# Patient Record
Sex: MALE | Race: WHITE | Employment: UNEMPLOYED | URBAN - METROPOLITAN AREA
[De-identification: names, ages, dates, MRNs, and addresses within clinical notes are randomized per-mention and may not be internally consistent; named-entity substitution may affect disease eponyms.]

---

## 2019-09-02 ENCOUNTER — HOSPITAL ENCOUNTER (INPATIENT)
Age: 34
LOS: 5 days | Discharge: HOME | DRG: 885 | End: 2019-09-07
Attending: PSYCHIATRY & NEUROLOGY | Admitting: PSYCHIATRY & NEUROLOGY

## 2019-09-02 PROCEDURE — 1240000000 HC EMOTIONAL WELLNESS R&B

## 2019-09-03 PROCEDURE — 6370000000 HC RX 637 (ALT 250 FOR IP): Performed by: PSYCHIATRY & NEUROLOGY

## 2019-09-03 PROCEDURE — 90792 PSYCH DIAG EVAL W/MED SRVCS: CPT | Performed by: NURSE PRACTITIONER

## 2019-09-03 PROCEDURE — 1240000000 HC EMOTIONAL WELLNESS R&B

## 2019-09-03 PROCEDURE — 6370000000 HC RX 637 (ALT 250 FOR IP): Performed by: NURSE PRACTITIONER

## 2019-09-03 RX ORDER — NICOTINE 21 MG/24HR
1 PATCH, TRANSDERMAL 24 HOURS TRANSDERMAL DAILY
Status: DISCONTINUED | OUTPATIENT
Start: 2019-09-03 | End: 2019-09-05

## 2019-09-03 RX ORDER — RISPERIDONE 1 MG/1
0.5 TABLET, FILM COATED ORAL 2 TIMES DAILY
Status: DISCONTINUED | OUTPATIENT
Start: 2019-09-03 | End: 2019-09-05

## 2019-09-03 RX ORDER — HYDROXYZINE HYDROCHLORIDE 25 MG/1
25 TABLET, FILM COATED ORAL 3 TIMES DAILY PRN
Status: DISCONTINUED | OUTPATIENT
Start: 2019-09-03 | End: 2019-09-07 | Stop reason: HOSPADM

## 2019-09-03 RX ORDER — TRAZODONE HYDROCHLORIDE 50 MG/1
50 TABLET ORAL NIGHTLY PRN
Status: DISCONTINUED | OUTPATIENT
Start: 2019-09-03 | End: 2019-09-03

## 2019-09-03 RX ORDER — TRAZODONE HYDROCHLORIDE 50 MG/1
50 TABLET ORAL NIGHTLY PRN
Status: DISCONTINUED | OUTPATIENT
Start: 2019-09-03 | End: 2019-09-07 | Stop reason: HOSPADM

## 2019-09-03 RX ORDER — QUETIAPINE FUMARATE 25 MG/1
25 TABLET, FILM COATED ORAL NIGHTLY
Status: DISCONTINUED | OUTPATIENT
Start: 2019-09-03 | End: 2019-09-07 | Stop reason: HOSPADM

## 2019-09-03 RX ADMIN — RISPERIDONE 0.5 MG: 1 TABLET ORAL at 21:04

## 2019-09-03 RX ADMIN — HYDROXYZINE HYDROCHLORIDE 25 MG: 25 TABLET, FILM COATED ORAL at 02:13

## 2019-09-03 RX ADMIN — QUETIAPINE FUMARATE 25 MG: 25 TABLET ORAL at 21:03

## 2019-09-03 RX ADMIN — HYDROXYZINE HYDROCHLORIDE 25 MG: 25 TABLET, FILM COATED ORAL at 23:47

## 2019-09-03 RX ADMIN — TRAZODONE HYDROCHLORIDE 50 MG: 50 TABLET ORAL at 00:19

## 2019-09-03 RX ADMIN — TRAZODONE HYDROCHLORIDE 50 MG: 50 TABLET ORAL at 23:47

## 2019-09-03 RX ADMIN — RISPERIDONE 0.5 MG: 1 TABLET ORAL at 16:37

## 2019-09-03 ASSESSMENT — SLEEP AND FATIGUE QUESTIONNAIRES
RESTFUL SLEEP: NO
DIFFICULTY STAYING ASLEEP: NO
DIFFICULTY FALLING ASLEEP: YES
SLEEP PATTERN: DIFFICULTY FALLING ASLEEP;INSOMNIA;RESTLESSNESS
DO YOU USE A SLEEP AID: NO
RESTFUL SLEEP: NO
DO YOU HAVE DIFFICULTY SLEEPING: YES
AVERAGE NUMBER OF SLEEP HOURS: 5
DO YOU HAVE DIFFICULTY SLEEPING: YES
DIFFICULTY FALLING ASLEEP: YES
DIFFICULTY ARISING: NO
DO YOU USE A SLEEP AID: NO
SLEEP PATTERN: DIFFICULTY FALLING ASLEEP;DISTURBED/INTERRUPTED SLEEP
DIFFICULTY ARISING: NO
DIFFICULTY STAYING ASLEEP: NO
AVERAGE NUMBER OF SLEEP HOURS: 2

## 2019-09-03 ASSESSMENT — PAIN - FUNCTIONAL ASSESSMENT: PAIN_FUNCTIONAL_ASSESSMENT: 0-10

## 2019-09-03 ASSESSMENT — LIFESTYLE VARIABLES
HISTORY_ALCOHOL_USE: NO
HISTORY_ALCOHOL_USE: YES

## 2019-09-03 ASSESSMENT — PAIN SCALES - GENERAL: PAINLEVEL_OUTOF10: 0

## 2019-09-03 NOTE — GROUP NOTE
Group Therapy Note    Date: September 3    Group Start Time: 400  Group End Time:   Group Topic: Healthy Living/Wellness    [facility]    Katherine Goldberg RN        Group Therapy Note    Attendees: 6         Patient's Goal:  ***    Notes:  ***    Status After Intervention:  {Status After Intervention:362591121}    Participation Level: {Participation Level:105118282}    Participation Quality: {[facility] PARTICIPATION QUALITY:204865357}      Speech:  {ED  CD_SPEECH:07171}      Thought Process/Content: {Thought Process/Content:405859569}      Affective Functioning: {Affective Functionin}      Mood: {Mood:645219210}      Level of consciousness:  {Level of consciousness:922229075}      Response to Learnin Melina Wade BHI Responses to Learnin}      Endings: {[facility] Endings:52786}    Modes of Intervention: {[facility] Modes of Intervention:266696701}      Discipline Responsible: {[facility] Multidisciplinary:222612611}      Signature:  Melissa Banks RN

## 2019-09-03 NOTE — BH NOTE
history of suicide attempts. History of Substance Abuse     Patient reports that he smokes approximately 3 packs of cigarettes weekly however; for the last 6 days he has been chain-smoking. He admits to weekly marijuana use however; he has been smoking one bowl daily for the last 6 days. He admits to ETOH use after week however; he reports that he has been consuming up to a six-pack daily for the last 6 days. He verbalized that all of his use has increased since submitting his report. Family History of psychiatric disorders    Family history: Aunt positive for schizophrenia      Medical History   Allergies:  Ofloxacin and Amoxicillin   History reviewed. No pertinent past medical history. History reviewed. No pertinent surgical history. Neurologic Exam      Mental Status   Oriented to person, place, and time. Oriented to city, area, street and number. Oriented to country. Registration: recalls 3 of 3 objects. Recall at 5 minutes: recalls 3 of 3 objects. Follows 3 step commands. Attention: normal. Concentration: normal.   Speech: speech is normal   Level of consciousness: alert  Knowledge: good. Able to perform simple calculations. Able to name object. Able to read. Able to repeat. Able to write.  Normal comprehension.      Cranial Nerves   Cranial nerves II through XII intact.      Motor Exam   Muscle bulk: normal  Overall muscle tone: normal     Strength   Strength 5/5 throughout.      Sensory Exam   Light touch normal.      Gait, Coordination, and Reflexes      Normal     Coordination   Romberg: negative     Tremor   Resting tremor: absent  Intention tremor: absent  Action tremor: absent     Reflexes   Right brachioradialis: 2+  Left brachioradialis: 2+  Right biceps: 2+  Left biceps: 2+  Right triceps: 2+  Left triceps: 2+  Right patellar: 2+  Left patellar: 2+  Right achilles: 2+  Left achilles: 2+  Right : 2+  Left : 2+    SOCIAL HISTORY: Patient lives with a friend in a house that he owns, he is single and does not have children. 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 Social History     Socioeconomic History    Marital status: Unknown     Spouse name: Not on file    Number of children: Not on file    Years of education: Not on file    Highest education level: Not on file   Occupational History    Not on file   Social Needs    Financial resource strain: Not on file    Food insecurity:     Worry: Not on file     Inability: Not on file    Transportation needs:     Medical: Not on file     Non-medical: Not on file   Tobacco Use    Smoking status: Current Every Day Smoker    Smokeless tobacco: Never Used   Substance and Sexual Activity    Alcohol use: Yes    Drug use: Yes     Types: Marijuana    Sexual activity: Not on file   Lifestyle    Physical activity:     Days per week: Not on file     Minutes per session: Not on file    Stress: Not on file   Relationships    Social connections:     Talks on phone: Not on file     Gets together: Not on file     Attends Holiness service: Not on file     Active member of club or organization: Not on file     Attends meetings of clubs or organizations: Not on file     Relationship status: Not on file    Intimate partner violence:     Fear of current or ex partner: Not on file     Emotionally abused: Not on file     Physically abused: Not on file     Forced sexual activity: Not on file   Other Topics Concern    Not on file   Social History Narrative    Not on file     Mental Status  Pt. was alert, fully oriented, and cooperative. Appearance and hygiene wereappropriate, well-groomed . Mood was euthymic.  Affect was euthymic with \"odd\" facial expressions Thought process was tangential. Patient denied any hallucinations or paranoia however; he verbalized that his phone is being tapped, he has been receiving weird emails and texts and that weird cars have been sitting outside of

## 2019-09-03 NOTE — GROUP NOTE
Group Therapy Note     Date: September 3  Group Start Time: 1430  Group End Time: 3063  Group Topic: Recovery Group     [facility]    CATHERINE Banks           Group Therapy Note     Attendees: 7 /11        Patient's Goal:  To increase social interaction, express feelings and to explore coping skills, positive people, places, and things r/t wellness and recovery in their home environment/community        Notes: Pt participated superficially in group task and was avoidant of expressing feelings r/t mental health/recovery and avoidant of exploring positive coping skills, people,places and things to work toward wellness and recovery. Pt did not relate to feelings and experiences of some peers in group. Pt is somewhat judgmental of select peers with non verbals but offers superficial positive feedback to other peers. Status After Intervention:  Unchanged     Participation Level: Active Listener and Interactive     Participation Quality:  Attentive, Sharing but unrelated to topic        Speech:   Normal     Thought Process/Content: Avoidant of topic, when asked about his own environment, supports and resources pt identified \"My uncle is a , I've been thinking about changing jobs -the world needs some new technology and approaches\". When again asked to relate his answers to feelings and resources r/t positive coping,wellness and recovery, pt was again evasive and avoidant.      Affective Functioning: Congruent     Mood: euthymic        Level of consciousness:  Alert, and Attentive        Response to Learning: Able to verbalize current knowledge/experience,  and Progressing to goal        Endings: None Reported     Modes of Intervention: Education, Support, Socialization, Exploration, Clarifying and Problem-solving        Discipline Responsible: Psychoeducational Specialist        Signature:  Chyna Rivera

## 2019-09-03 NOTE — CARE COORDINATION
BHI Biopsychosocial Assessment    Current Level of Psychosocial Functioning     Independent   Dependent    Minimal Assist X    Comments:  PT has a principle diagnosis of psychosis due to emotional stress per admission not in ED    Psychosocial High Risk Factors (check all that apply)    Unable to obtain meds   Chronic illness/pain    Substance abuse X  Lack of Family Support   Financial stress X  Isolation   Inadequate Community Resources X  Suicide attempt(s) X  Not taking medications X  Victim of crime   Developmental Delay  Unable to manage personal needs    Age 72 or older   Homeless  No transportation   Readmission within 30 days  Unemployment  Traumatic Event X    Psychiatric Advanced Directives: Nothing reported    Family to [facility] in Treatment:  Father, Kylee Us, [phone], no JOE    Sexual Orientation:  PT is currently single    Patient Strengths: Income; safe housing; employed    Patient Barriers: No health insurance; not link    AOD Referral and/or Education Provided:  PT reports drinks alcohol and smokes marijuana    CMHC/mental health history: PT will be linked    Plan of Care   medication management, group/individual therapies, family meetings, psycho -education, treatment team meetings to assist with stabilization    Initial Discharge Plan:  Stabilize on medications; make follow-up appointment at a [facility] close to home; family transportation back to home    Clinical Summary:  Sera Esteban is a 26 y.o. male who was voluntarily admitted from [facility] ER for paranoid and delusional thoughts and behavior. He has not slept for the past five to six days due to his paranoia. He reports that the government is after him for a report that he made regarding illegal activity by the [facility]. Today Jenness Galeazzi is seen in the day room. He has been out social in the milieu with others. He is dressed in street clothes and is friendly upon approach.  He quickly becomes paranoid with writer when talking about

## 2019-09-03 NOTE — GROUP NOTE
Group Therapy Note     Date: September 3  Group Start Time: 900am  Group End Time: 940am  Group Topic: Community meeting     VALENCIA Horn, [address]            Group Therapy Note     Attendees: 6/11        Patient's Goal:  To increase social interaction and to explore and identify short term goals r/t wellness and recovery. RT discussed group schedule and unit structure/resources and encouraged pts to be engaged in their treatment. Pts were given opportunities to ask questions. Notes: Pt participated in group task and was able to identify short term goals r/t wellness and recovery. Pt is pleasant and in control but did ask a peer \"Could you express yourself in a less hostile way? \" when peer was actually calming just stating she was frustrated and did not feel she need to stay a long time. Pt asks many questions, some a second time after being given a lengthy explanation. Pt is very focused on watching RT's hands as she sometimes moves them while talking. Status After Intervention:  Improved     Participation Level: Active Listener and Interactive     Participation Quality: Appropriate, Attentive, Sharing         Speech:   Normal     Thought Process/Content: Logical, somewhat overly detail oriented     Affective Functioning: Constricted     Mood: somewhat vigilant with questions and watching peers/RT's movements, perceived \"hostile\" mood in peer who was simply sharing frustration appropriately.         Level of consciousness:  Alert, and Attentive        Response to Learning: Able to verbalize current knowledge/experience, Able to verbalize/acknowledge new learning, and Progressing to goal        Endings: None Reported     Modes of Intervention: Education, Support, Socialization, Exploration, Clarifying and Problem-solving        Discipline Responsible: Psychoeducational Specialist        Signature:  Kareen Erwin

## 2019-09-04 PROCEDURE — 99232 SBSQ HOSP IP/OBS MODERATE 35: CPT | Performed by: NURSE PRACTITIONER

## 2019-09-04 PROCEDURE — 1240000000 HC EMOTIONAL WELLNESS R&B

## 2019-09-04 PROCEDURE — 6370000000 HC RX 637 (ALT 250 FOR IP): Performed by: NURSE PRACTITIONER

## 2019-09-04 PROCEDURE — 90833 PSYTX W PT W E/M 30 MIN: CPT | Performed by: NURSE PRACTITIONER

## 2019-09-04 PROCEDURE — 6370000000 HC RX 637 (ALT 250 FOR IP): Performed by: PSYCHIATRY & NEUROLOGY

## 2019-09-04 RX ADMIN — TRAZODONE HYDROCHLORIDE 50 MG: 50 TABLET ORAL at 21:10

## 2019-09-04 RX ADMIN — QUETIAPINE FUMARATE 25 MG: 25 TABLET ORAL at 21:09

## 2019-09-04 RX ADMIN — RISPERIDONE 0.5 MG: 1 TABLET ORAL at 21:09

## 2019-09-04 RX ADMIN — RISPERIDONE 0.5 MG: 1 TABLET ORAL at 08:35

## 2019-09-04 NOTE — PROGRESS NOTES
Pharmacy Med Education Group Note    Date: 9/4/19  Start Time: 1430  End Time: 3595    Number Participants in Group:  8    Goal:  Patient will demonstrate an understanding of the medications intended purpose and possible adverse effects  Topic: [place] for Pharmacy Med Ed Group    Discipline Responsible:     OT  AT  [facility].  RT     X Other       Participation Level:     None  Minimal      X Active Listener    X Interactive    Monopolizing         Participation Quality:    X Appropriate  Inappropriate     X       Attentive        Intrusive          Sharing        Resistant          Supportive        Lethargic       Affective:     X Congruent  Incongruent  Blunted  Flat    Constricted  Anxious  Elated  Angry    Labile  Depressed  Other         Cognitive:    X Alert  Oriented PPTP     Concentration   X G  F  P   Attention Span   X G  F  P   Short-Term Memory   X G  F  P   Long-Term Memory  G  F  P   ProblemSolving/  Decision Making  G  F  P   Ability to Process  Information   X G  F  P      Contributing Factors             Delusional             Hallucinating             Flight of Ideas             Other:       Modes of Intervention:    X Education   X Support  Exploration    Clarifying  Problem Solving  Confrontation    Socialization  Limit Setting  Reality Testing    Activity  Movement  Media    Other:            Response to Learning:    X Able to verbalize current knowledge/experience    Able to verbalize/acknowledge new learning    Able to retain information    Capable of insight    Able to change behavior    Progressing to goal    Other:        Comments:     Jodie Taylor,PharmD, 9/4/2019, 4:23 PM

## 2019-09-05 PROCEDURE — 99232 SBSQ HOSP IP/OBS MODERATE 35: CPT | Performed by: NURSE PRACTITIONER

## 2019-09-05 PROCEDURE — 1240000000 HC EMOTIONAL WELLNESS R&B

## 2019-09-05 PROCEDURE — 90833 PSYTX W PT W E/M 30 MIN: CPT | Performed by: NURSE PRACTITIONER

## 2019-09-05 PROCEDURE — 6370000000 HC RX 637 (ALT 250 FOR IP): Performed by: NURSE PRACTITIONER

## 2019-09-05 PROCEDURE — 6370000000 HC RX 637 (ALT 250 FOR IP): Performed by: PSYCHIATRY & NEUROLOGY

## 2019-09-05 RX ORDER — RISPERIDONE 1 MG/1
1 TABLET, FILM COATED ORAL 2 TIMES DAILY
Status: DISCONTINUED | OUTPATIENT
Start: 2019-09-05 | End: 2019-09-07 | Stop reason: HOSPADM

## 2019-09-05 RX ADMIN — RISPERIDONE 0.5 MG: 1 TABLET ORAL at 08:06

## 2019-09-05 RX ADMIN — QUETIAPINE FUMARATE 25 MG: 25 TABLET ORAL at 20:36

## 2019-09-05 RX ADMIN — HYDROXYZINE HYDROCHLORIDE 25 MG: 25 TABLET, FILM COATED ORAL at 20:36

## 2019-09-05 RX ADMIN — RISPERIDONE 1 MG: 1 TABLET ORAL at 20:36

## 2019-09-05 RX ADMIN — HYDROXYZINE HYDROCHLORIDE 25 MG: 25 TABLET, FILM COATED ORAL at 00:05

## 2019-09-05 RX ADMIN — TRAZODONE HYDROCHLORIDE 50 MG: 50 TABLET ORAL at 20:36

## 2019-09-05 NOTE — PLAN OF CARE
Pt is not currently a behavioral management issue. Pt is medication compliant, and received meds per drs orders. Safety checks are are maintained every 15 minutes, irregular intervals, and shift changes.

## 2019-09-05 NOTE — GROUP NOTE
Group Therapy Note    Date: September 5    Group Start Time: 1330  Group End Time 1521  Group Topic: cognitive skills     [facility]    CATHERINE Dasilva      Group Therapy Note    Attendees: 8/13       Patient's Goal:  Improve concentration and cognitive skills     Notes:   Pt participated and was pleasant    Status After Intervention:  Improved    Participation Level:  Active Listener    Participation Quality: Appropriate      Speech:  normal      Thought Process/Content: Logical      Affective Functioning: Congruent      Level of consciousness:  Alert, Oriented x4 and Attentive      Response to Learning: Able to verbalize current knowledge/experience and Progressing to goal      Endings: None Reported    Modes of Intervention: Education and Problem-solving      Discipline Responsible: Psychoeducational Specialist      Signature:  Wilfredo Lebron

## 2019-09-05 NOTE — PLAN OF CARE
Problem: Altered Mood, Psychotic Behavior:  Goal: Absence of self-harm  Description  Absence of self-harm  9/5/2019 0011 by Sophie Duarte LPN  Outcome: Met This Shift  Note:   Patient remains free of self harm, Pt denies the thought of self harm and agrees to seek staff help should it arise. 15 minute checks maintained for patient safety.      Problem: Altered Mood, Psychotic Behavior:  Goal: Able to verbalize reality based thinking  Description  Able to verbalize reality based thinking  9/5/2019 0011 by Sophie Duarte LPN  Outcome: Ongoing

## 2019-09-06 PROCEDURE — 6370000000 HC RX 637 (ALT 250 FOR IP): Performed by: NURSE PRACTITIONER

## 2019-09-06 PROCEDURE — 90833 PSYTX W PT W E/M 30 MIN: CPT | Performed by: NURSE PRACTITIONER

## 2019-09-06 PROCEDURE — 99232 SBSQ HOSP IP/OBS MODERATE 35: CPT | Performed by: NURSE PRACTITIONER

## 2019-09-06 PROCEDURE — 1240000000 HC EMOTIONAL WELLNESS R&B

## 2019-09-06 PROCEDURE — 6370000000 HC RX 637 (ALT 250 FOR IP): Performed by: PSYCHIATRY & NEUROLOGY

## 2019-09-06 RX ORDER — QUETIAPINE FUMARATE 25 MG/1
25 TABLET, FILM COATED ORAL NIGHTLY
Qty: 14 TABLET | Refills: 0 | Status: SHIPPED | OUTPATIENT
Start: 2019-09-06

## 2019-09-06 RX ORDER — TRAZODONE HYDROCHLORIDE 50 MG/1
50 TABLET ORAL NIGHTLY PRN
Qty: 14 TABLET | Refills: 0 | Status: SHIPPED | OUTPATIENT
Start: 2019-09-06

## 2019-09-06 RX ORDER — HYDROXYZINE HYDROCHLORIDE 25 MG/1
25 TABLET, FILM COATED ORAL 3 TIMES DAILY PRN
Qty: 42 TABLET | Refills: 0 | Status: SHIPPED | OUTPATIENT
Start: 2019-09-06 | End: 2019-09-20

## 2019-09-06 RX ORDER — RISPERIDONE 1 MG/1
1 TABLET, FILM COATED ORAL 2 TIMES DAILY
Qty: 28 TABLET | Refills: 0 | Status: SHIPPED | OUTPATIENT
Start: 2019-09-06

## 2019-09-06 RX ADMIN — QUETIAPINE FUMARATE 25 MG: 25 TABLET ORAL at 20:46

## 2019-09-06 RX ADMIN — RISPERIDONE 1 MG: 1 TABLET ORAL at 09:33

## 2019-09-06 RX ADMIN — RISPERIDONE 1 MG: 1 TABLET ORAL at 20:46

## 2019-09-06 RX ADMIN — TRAZODONE HYDROCHLORIDE 50 MG: 50 TABLET ORAL at 20:46

## 2019-09-06 RX ADMIN — HYDROXYZINE HYDROCHLORIDE 25 MG: 25 TABLET, FILM COATED ORAL at 20:46

## 2019-09-06 NOTE — PLAN OF CARE
Problem: Altered Mood, Psychotic Behavior:  Goal: Able to verbalize reality based thinking  Description  Able to verbalize reality based thinking  9/5/2019 2112 by Francine Neal RN  Outcome: Ongoing  Note:   Patient reports his thoughts are improving. He says he had a good day and had a good visit with his parents. Patient apologized for being worried about his medications from the previous night. Patient is having some mild anxiety at this time and denies any other problems. Patient is not experiencing suicidal ideations at this time. No paranoid or bizarre behavior has been noted. Patient says he slept well last night. Patient had no concerns when taking his night time medications. He is social on the unit with others. Patient remains safe on the unit with safety checks every 15 minutes and as needed. Problem: Pain:  Goal: Pain level will decrease  Description  Pain level will decrease  9/5/2019 2112 by Francine Neal RN  Outcome: Ongoing  Note:   Patient has no complaints of pain at this time.

## 2019-09-06 NOTE — GROUP NOTE
Group Therapy Note    Date: September 6    Group Start Time: 1330  Group End Time: 8408  Group Topic: Cognitive Skills    [facility]    Matthew Williamson [place]        Group Therapy Note    Attendees: 8/15       Patient's Goal:  To increase social interaction and to practice problem solving and communication. Notes: Pt participated fully in group task . Pt was able to practice problem solving and communication independently and was given prompts r/t problem solving to work on solving clue if needed. Pt was pleasant and supportive of peers. Status After Intervention:  Improved    Participation Level:  Active Listener and Interactive    Participation Quality: Appropriate, Attentive, Sharing and Supportive      Speech:  normal      Thought Process/Content: Logical  Linear      Affective Functioning: Congruent      Mood: euthymic      Level of consciousness:  Alert, Oriented x4 and Attentive      Response to Learning: Able to verbalize current knowledge/experience, Able to verbalize/acknowledge new learning, Able to retain information and Progressing to goal      Endings: None Reported    Modes of Intervention: Education, Support, Socialization, Exploration, Clarifying and Problem-solving      Discipline Responsible: Psychoeducational Specialist      Signature:  Beba Holley

## 2019-09-06 NOTE — GROUP NOTE
Group Therapy Note    Date: September 6    Group Start Time: 1100  Group End Time: 1306  Group Topic: Cognitive Skills    [facility] D    [place], [place]        Group Therapy Note    Attendees: 12/18       Patient's Goal:  To increase social interaction and to practice deductive reasoning, finding common ground with peers  and communication. Notes: Pt participated fully in group task . Pt was able to practice deductive reasoning, finding common ground with peers, and communication independently and was given prompts r/t problem solving to work on solving clue if needed. Pt was pleasant and supportive of peers. Status After Intervention:  Improved    Participation Level:  Active Listener and Interactive    Participation Quality: Appropriate, Attentive, Sharing and Supportive      Speech:  normal      Thought Process/Content: Logical  Linear      Affective Functioning: Congruent      Mood: euthymic      Level of consciousness:  Alert, Oriented x4 and Attentive      Response to Learning: Able to verbalize current knowledge/experience, Able to verbalize/acknowledge new learning, Able to retain information and Progressing to goal      Endings: None Reported    Modes of Intervention: Education, Support, Socialization, Exploration, Clarifying and Problem-solving      Discipline Responsible: Psychoeducational Specialist      Signature:  David Burrows

## 2019-09-07 VITALS
BODY MASS INDEX: 28.93 KG/M2 | HEIGHT: 66 IN | TEMPERATURE: 98.4 F | HEART RATE: 84 BPM | WEIGHT: 180 LBS | RESPIRATION RATE: 14 BRPM | SYSTOLIC BLOOD PRESSURE: 120 MMHG | DIASTOLIC BLOOD PRESSURE: 77 MMHG

## 2019-09-07 PROCEDURE — 6370000000 HC RX 637 (ALT 250 FOR IP): Performed by: NURSE PRACTITIONER

## 2019-09-07 PROCEDURE — 6370000000 HC RX 637 (ALT 250 FOR IP): Performed by: PSYCHIATRY & NEUROLOGY

## 2019-09-07 PROCEDURE — 99238 HOSP IP/OBS DSCHRG MGMT 30/<: CPT | Performed by: NURSE PRACTITIONER

## 2019-09-07 RX ADMIN — RISPERIDONE 1 MG: 1 TABLET ORAL at 08:22

## 2019-09-07 RX ADMIN — HYDROXYZINE HYDROCHLORIDE 25 MG: 25 TABLET, FILM COATED ORAL at 09:37

## 2019-09-07 NOTE — BH NOTE
[facility]  Discharge Note    Pt discharged with followings belongings:   Dentures: None  Vision - Corrective Lenses: None  Hearing Aid: None  Jewelry: None  Body Piercings Removed: No  Clothing: Socks, Footwear, Undergarments (Comment), Jacket / coat, Pants, Shirt  Were All Patient Medications Collected?: Not Applicable  Other Valuables: Money (Comment), Other (Comment)(see chart)   Valuables sent home with Patient. Valuables retrieved from safe, Security envelope number:  [phone] and returned to patient. Patient education on aftercare instructions: yes  Information faxed to [facility] by [facility] RN Patient verbalize understanding of AVS:  Yes. Patient was picked up by mother and walked to front door without incident. He was happy to go home. He has a strong support system and has plans in place. 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 He states he feels much better and believes he got what he needed out of his stay here. He did mention his displeasure in the patient's being drug addicts and felt that was a disservice to him. He was also displeased that he is self pay and is \"going to be surprised with a bill\" for his stay here. He was offered a [place] which he did take his time and filled out.      Status EXAM upon discharge:  Status and Exam  Normal: No  Facial Expression: Flat  Affect: Appropriate  Level of Consciousness: Alert  Mood:Normal: Yes  Mood: Anxious  Motor Activity:Normal: No  Motor Activity: Decreased  Interview Behavior: Cooperative  Preception: [place] to Person, Earney Pouch to Time, [place] to Place, [place] to Situation  Attention:Normal: Yes  Attention: Distractible  Thought Processes: Circumstantial  Thought Content:Normal: No  Thought Content: Preoccupations  Hallucinations: None  Delusions: No  Delusions: Obsessions  Memory:Normal: Yes  Memory: (P) Poor Recent  Insight and Judgment: No  Insight and Judgment: Poor Insight  Present Suicidal Ideation: No  Present

## 2019-09-07 NOTE — BH NOTE
Patient given tobacco quitline number 37227800787 at this time, refusing to call at this time, states \" I just dont want to quit now\"- patient given information as to the dangers of long term tobacco use. Continue to reinforce the importance of tobacco cessation.